# Patient Record
Sex: FEMALE | Race: AMERICAN INDIAN OR ALASKA NATIVE
[De-identification: names, ages, dates, MRNs, and addresses within clinical notes are randomized per-mention and may not be internally consistent; named-entity substitution may affect disease eponyms.]

---

## 2018-04-13 ENCOUNTER — HOSPITAL ENCOUNTER (OUTPATIENT)
Dept: HOSPITAL 5 - XRAY | Age: 68
Discharge: HOME | End: 2018-04-13
Attending: FAMILY MEDICINE
Payer: MEDICARE

## 2018-04-13 DIAGNOSIS — M47.896: Primary | ICD-10-CM

## 2018-04-13 DIAGNOSIS — M25.552: ICD-10-CM

## 2018-04-13 PROCEDURE — 72110 X-RAY EXAM L-2 SPINE 4/>VWS: CPT

## 2018-04-13 NOTE — XRAY REPORT
FINAL REPORT



PROCEDURE:  XR HIP 2-3V LT



TECHNIQUE:  LEFT hip radiographs, 2 views each, including AP view

of the pelvis. 



HISTORY:  HIP PAIN 



COMPARISON:  No prior studies are available for comparison.



FINDINGS:  

Fracture (s) and/or Dislocation(s): None .



Joint space(s): Normal.



Soft tissues: Normal. 



Bone mineralization: Normal. 



Foreign bodies: None. 



IMPRESSION:  

Normal Examination.

## 2018-04-13 NOTE — XRAY REPORT
FINAL REPORT



PROCEDURE:  XR SPINE LUMBOSACRAL 4+V



TECHNIQUE:  Lumbar spine radiographs, including AP, lateral,

oblique, and lumbosacral spot views. 



HISTORY:  LOW BACK PAIN 



COMPARISON:  No prior studies are available for comparison.



FINDINGS:  

Alignment: Normal.



Vertebral body heights/Disk spaces: Disc space narrowing with

spurring at L4-5. Mild spurring at L3-4



Fracture(s): None.



Facets: Normal.



Bone mineralization: Normal.



IMPRESSION:  

Degenerative change. No fracture seen.